# Patient Record
Sex: FEMALE | Race: WHITE | NOT HISPANIC OR LATINO | URBAN - METROPOLITAN AREA
[De-identification: names, ages, dates, MRNs, and addresses within clinical notes are randomized per-mention and may not be internally consistent; named-entity substitution may affect disease eponyms.]

---

## 2018-06-01 ENCOUNTER — OUTPATIENT (OUTPATIENT)
Dept: OUTPATIENT SERVICES | Facility: HOSPITAL | Age: 53
LOS: 1 days | End: 2018-06-01
Payer: MEDICAID

## 2018-06-01 PROCEDURE — G9001: CPT

## 2018-06-15 DIAGNOSIS — R69 ILLNESS, UNSPECIFIED: ICD-10-CM

## 2018-07-01 ENCOUNTER — OUTPATIENT (OUTPATIENT)
Dept: OUTPATIENT SERVICES | Facility: HOSPITAL | Age: 53
LOS: 1 days | End: 2018-07-01

## 2018-07-19 DIAGNOSIS — Z71.89 OTHER SPECIFIED COUNSELING: ICD-10-CM

## 2024-06-11 ENCOUNTER — EMERGENCY (EMERGENCY)
Facility: HOSPITAL | Age: 59
LOS: 1 days | Discharge: HOME | End: 2024-06-11
Attending: EMERGENCY MEDICINE | Admitting: EMERGENCY MEDICINE
Payer: MEDICAID

## 2024-06-11 PROCEDURE — 99285 EMERGENCY DEPT VISIT HI MDM: CPT

## 2024-06-12 VITALS
RESPIRATION RATE: 16 BRPM | HEART RATE: 60 BPM | DIASTOLIC BLOOD PRESSURE: 78 MMHG | SYSTOLIC BLOOD PRESSURE: 133 MMHG | OXYGEN SATURATION: 96 %

## 2024-06-12 VITALS
DIASTOLIC BLOOD PRESSURE: 61 MMHG | TEMPERATURE: 98 F | SYSTOLIC BLOOD PRESSURE: 153 MMHG | RESPIRATION RATE: 18 BRPM | HEART RATE: 71 BPM | OXYGEN SATURATION: 100 %

## 2024-06-12 LAB
ALBUMIN SERPL ELPH-MCNC: 3.6 G/DL — SIGNIFICANT CHANGE UP (ref 3.3–5)
ALP SERPL-CCNC: 95 U/L — SIGNIFICANT CHANGE UP (ref 40–120)
ALT FLD-CCNC: 12 U/L — SIGNIFICANT CHANGE UP (ref 4–33)
ANION GAP SERPL CALC-SCNC: 15 MMOL/L — HIGH (ref 7–14)
APTT BLD: 30.2 SEC — SIGNIFICANT CHANGE UP (ref 24.5–35.6)
AST SERPL-CCNC: 18 U/L — SIGNIFICANT CHANGE UP (ref 4–32)
BASE EXCESS BLDV CALC-SCNC: 4.1 MMOL/L — HIGH (ref -2–3)
BASOPHILS # BLD AUTO: 0.03 K/UL — SIGNIFICANT CHANGE UP (ref 0–0.2)
BASOPHILS NFR BLD AUTO: 0.3 % — SIGNIFICANT CHANGE UP (ref 0–2)
BILIRUB SERPL-MCNC: <0.2 MG/DL — SIGNIFICANT CHANGE UP (ref 0.2–1.2)
BLOOD GAS VENOUS COMPREHENSIVE RESULT: SIGNIFICANT CHANGE UP
BUN SERPL-MCNC: 12 MG/DL — SIGNIFICANT CHANGE UP (ref 7–23)
CALCIUM SERPL-MCNC: 8.6 MG/DL — SIGNIFICANT CHANGE UP (ref 8.4–10.5)
CHLORIDE BLDV-SCNC: 100 MMOL/L — SIGNIFICANT CHANGE UP (ref 96–108)
CHLORIDE SERPL-SCNC: 99 MMOL/L — SIGNIFICANT CHANGE UP (ref 98–107)
CO2 BLDV-SCNC: 33.6 MMOL/L — HIGH (ref 22–26)
CO2 SERPL-SCNC: 24 MMOL/L — SIGNIFICANT CHANGE UP (ref 22–31)
CREAT SERPL-MCNC: 0.67 MG/DL — SIGNIFICANT CHANGE UP (ref 0.5–1.3)
EGFR: 101 ML/MIN/1.73M2 — SIGNIFICANT CHANGE UP
EOSINOPHIL # BLD AUTO: 0.14 K/UL — SIGNIFICANT CHANGE UP (ref 0–0.5)
EOSINOPHIL NFR BLD AUTO: 1.3 % — SIGNIFICANT CHANGE UP (ref 0–6)
GAS PNL BLDV: 134 MMOL/L — LOW (ref 136–145)
GLUCOSE BLDV-MCNC: 127 MG/DL — HIGH (ref 70–99)
GLUCOSE SERPL-MCNC: 122 MG/DL — HIGH (ref 70–99)
HCO3 BLDV-SCNC: 32 MMOL/L — HIGH (ref 22–29)
HCT VFR BLD CALC: 45.6 % — HIGH (ref 34.5–45)
HCT VFR BLDA CALC: 46 % — SIGNIFICANT CHANGE UP (ref 34.5–46.5)
HGB BLD CALC-MCNC: 15.3 G/DL — SIGNIFICANT CHANGE UP (ref 11.7–16.1)
HGB BLD-MCNC: 15 G/DL — SIGNIFICANT CHANGE UP (ref 11.5–15.5)
IANC: 7.1 K/UL — SIGNIFICANT CHANGE UP (ref 1.8–7.4)
IMM GRANULOCYTES NFR BLD AUTO: 0.5 % — SIGNIFICANT CHANGE UP (ref 0–0.9)
INR BLD: 1.13 RATIO — SIGNIFICANT CHANGE UP (ref 0.85–1.18)
LACTATE BLDV-MCNC: 1.7 MMOL/L — SIGNIFICANT CHANGE UP (ref 0.5–2)
LYMPHOCYTES # BLD AUTO: 2.55 K/UL — SIGNIFICANT CHANGE UP (ref 1–3.3)
LYMPHOCYTES # BLD AUTO: 23.6 % — SIGNIFICANT CHANGE UP (ref 13–44)
MAGNESIUM SERPL-MCNC: 2.2 MG/DL — SIGNIFICANT CHANGE UP (ref 1.6–2.6)
MCHC RBC-ENTMCNC: 27.3 PG — SIGNIFICANT CHANGE UP (ref 27–34)
MCHC RBC-ENTMCNC: 32.9 GM/DL — SIGNIFICANT CHANGE UP (ref 32–36)
MCV RBC AUTO: 82.9 FL — SIGNIFICANT CHANGE UP (ref 80–100)
MONOCYTES # BLD AUTO: 0.93 K/UL — HIGH (ref 0–0.9)
MONOCYTES NFR BLD AUTO: 8.6 % — SIGNIFICANT CHANGE UP (ref 2–14)
NEUTROPHILS # BLD AUTO: 7.1 K/UL — SIGNIFICANT CHANGE UP (ref 1.8–7.4)
NEUTROPHILS NFR BLD AUTO: 65.7 % — SIGNIFICANT CHANGE UP (ref 43–77)
NRBC # BLD: 0 /100 WBCS — SIGNIFICANT CHANGE UP (ref 0–0)
NRBC # FLD: 0 K/UL — SIGNIFICANT CHANGE UP (ref 0–0)
PCO2 BLDV: 59 MMHG — HIGH (ref 39–52)
PH BLDV: 7.34 — SIGNIFICANT CHANGE UP (ref 7.32–7.43)
PLATELET # BLD AUTO: 256 K/UL — SIGNIFICANT CHANGE UP (ref 150–400)
PO2 BLDV: 31 MMHG — SIGNIFICANT CHANGE UP (ref 25–45)
POTASSIUM BLDV-SCNC: 4.5 MMOL/L — SIGNIFICANT CHANGE UP (ref 3.5–5.1)
POTASSIUM SERPL-MCNC: 4.6 MMOL/L — SIGNIFICANT CHANGE UP (ref 3.5–5.3)
POTASSIUM SERPL-SCNC: 4.6 MMOL/L — SIGNIFICANT CHANGE UP (ref 3.5–5.3)
PROT SERPL-MCNC: 6.7 G/DL — SIGNIFICANT CHANGE UP (ref 6–8.3)
PROTHROM AB SERPL-ACNC: 12.6 SEC — SIGNIFICANT CHANGE UP (ref 9.5–13)
RBC # BLD: 5.5 M/UL — HIGH (ref 3.8–5.2)
RBC # FLD: 13.4 % — SIGNIFICANT CHANGE UP (ref 10.3–14.5)
SAO2 % BLDV: 52.1 % — LOW (ref 67–88)
SODIUM SERPL-SCNC: 138 MMOL/L — SIGNIFICANT CHANGE UP (ref 135–145)
WBC # BLD: 10.8 K/UL — HIGH (ref 3.8–10.5)
WBC # FLD AUTO: 10.8 K/UL — HIGH (ref 3.8–10.5)

## 2024-06-12 PROCEDURE — 70450 CT HEAD/BRAIN W/O DYE: CPT | Mod: 26,MC

## 2024-06-12 PROCEDURE — 93010 ELECTROCARDIOGRAM REPORT: CPT

## 2024-06-12 PROCEDURE — 70481 CT ORBIT/EAR/FOSSA W/DYE: CPT | Mod: 26,MC

## 2024-06-12 RX ORDER — ACETAMINOPHEN 500 MG
1000 TABLET ORAL ONCE
Refills: 0 | Status: COMPLETED | OUTPATIENT
Start: 2024-06-12 | End: 2024-06-12

## 2024-06-12 RX ORDER — KETOROLAC TROMETHAMINE 30 MG/ML
15 SYRINGE (ML) INJECTION ONCE
Refills: 0 | Status: DISCONTINUED | OUTPATIENT
Start: 2024-06-12 | End: 2024-06-12

## 2024-06-12 RX ADMIN — Medication 15 MILLIGRAM(S): at 05:20

## 2024-06-12 RX ADMIN — Medication 400 MILLIGRAM(S): at 01:25

## 2024-06-12 NOTE — ED PROVIDER NOTE - NSFOLLOWUPINSTRUCTIONS_ED_ALL_ED_FT
You were seen in the Emergency Department for right facial droop, right eye blurry vision, right arm numbness.     1) Advance activity as tolerated.   2) Take tylenol 975mg every 6-8 hours and Motrin 400mg every 6-8 hours. Continue all previously prescribed medications as directed.    3) Follow up with ENT and your primary care physician in 2-3 days for the symptoms, and known mass in your nose that was re-demonstrated on CT - take copies of your results.    4) Return to the Emergency Department for worsening or persistent symptoms, and/or ANY NEW OR CONCERNING SYMPTOMS.    SEEK IMMEDIATE MEDICAL CARE IF YOU HAVE ANY OF THE FOLLOWING SYMPTOMS: an injury or infection that is not healing, dizziness, vomiting, paralysis, chest pain, or trouble breathing.

## 2024-06-12 NOTE — ED PROVIDER NOTE - PATIENT PORTAL LINK FT
You can access the FollowMyHealth Patient Portal offered by [facility] by registering at the following website: [email or web address]. By joining [facility]’s FollowMyHealth portal, you will also be able to view your health information using other applications (apps) compatible with our system.

## 2024-06-12 NOTE — ED ADULT NURSE NOTE - OBJECTIVE STATEMENT
Break RN: 58yo female received in room 4. pt A&Ox4, ambulatory with cane, c/o headache x 2 weeks, and right facial droop and numbness, blurriness in right eye since Wednesday. Was evaluated in [facility] and was prescribed prednisone without relief. Patient upper and lower extremities strength equal. Some slurred speech noted.  Respiration even and non-labored. in NAD. 20G IV placed to LAC, lab drawn and sent. Side rails up, bed at lowest position, call bell within reach, patient oriented to the unit, safety maintained. Pending CT

## 2024-06-12 NOTE — CHART NOTE - NSCHARTNOTEFT_GEN_A_CORE
Per chart review, pt is cleared for DC. SW followed up with provider who confirmed pt is DC and will require wheelchair ambulette for DC home.     SW arranged trip through [facility] portal (Inv# [id number]). Trip assigned to [facility] who is not contracted with [facility]. Trip was rescheduled multiple times to [facility], [facility] and [facility] all of whom have No availability in the next few hours/today at all.     To avoid DC delay, SW arranged bill back ambulette with [facility] for 12:30pm (next available). Trip# [id number].    No other SW needs identified at this time.

## 2024-06-12 NOTE — ED PROVIDER NOTE - NSFOLLOWUPCLINICS_GEN_ALL_ED_FT
[facility]  Otolaryngology (ENT)  [address], [address]  [place] [address]  Phone: [phone]  Fax:     [facility] - ENT  Otolaryngology (ENT)  [address] [place] [address]  Phone: [phone]  Fax:

## 2024-06-12 NOTE — ED ADULT TRIAGE NOTE - CHIEF COMPLAINT QUOTE
c/o right side facial droop, right eye blurriness and right arm numbness since last week Wednesday. Had headache x 2 weeks, was seen in [facility] and prescribed Ibuprofen. Hx of HTN c/o right side facial droop, right eye blurriness and right face numbness since last week Wednesday. Had headache x 2 weeks, was seen in [facility] and prescribed prednisone. Hx of HTN

## 2024-06-12 NOTE — ED PROVIDER NOTE - CARE PLAN
Principal Discharge DX:	Facial droop  Secondary Diagnosis:	Numbness  Secondary Diagnosis:	Blurry vision   1

## 2024-06-12 NOTE — ED PROVIDER NOTE - ATTENDING CONTRIBUTION TO CARE
59-year-old female with history of hypertension presenting with right-sided ear/head pain and right-sided facial droop.  Patient reports she was seen on 6/1/2024 at [facility] for pain to the right side of her head.  Paperwork from that time reviewed including CT imaging of the temporal bone/CT angio/CT brain, which was unremarkable aside from a R nasal mass for which she was sent to f/u with ENT (pt did not go to her appt on 6/8).  Patient was discharged on a course of prednisone which she completed 2 days ago.  Patient reports on 6/5/2024 she develops right-sided facial droop, unable to close the right side of her mouth or her right eye.  No associated fever, chills, URI symptoms, trauma or injury, other weakness/numbness/tingling.    Well appearing, sitting comfortably in stretcher, awake and alert, nontoxic.  AF/VSS.  NCAT EOMI PERRL, R upper and lower facial droop. Bilat ears normal, tm with no effusion, normal light reflex.  Neck supple.  Lungs cta bl.  Cards nl S1/S2, RRR, no MRG.  Abd soft ntnd.  No other focal neuro deficits.    Patient with right-sided Bell's palsy.  She has completed outpatient course of prednisone, is now out of the time window for antiviral therapy.  Given associated right sided headache with mastoid tenderness on exam will repeat CT imaging to rule out underlying mastoiditis although lower suspicion given recent negative CT.  Plan for labs, CT, pain meds, reassess for outpatient ENT follow-up.

## 2024-06-12 NOTE — ED PROVIDER NOTE - CLINICAL SUMMARY MEDICAL DECISION MAKING FREE TEXT BOX
59-year-old female pmhx of hypertension comes to ED w/ right facial droop, right eye blurry vision, right arm numbness for 1 week. Patient reports that symptoms initially started 2 weeks prior, worsened to the facial droop 1 week prior.  Patient went to [facility] for evaluation and received CTs which were normal.  Patient unclear what diagnosis they were discharged with but reports being discharged with prednisone and ibuprofen outpatient.  Patient completed course however reports that symptoms have worsened prompting her to come to emergency department.  Their pain/symptom is moderate, constant, non mediating with rest. Denies trauma, falls, fever, LOC, Head Trauma, AMS, dizziness, syncope, shortness of breath, cough, rhinorrhea, congestion, chest pain, palpitations, abdominal pain, nausea, vomiting, diarrhea, constipation, melena, hematochezia, dysuria, hematuria, urinary frequency, flank pain, skin changes, p.o. issues, issues urinating, issues stooling, issues with ambulation, back pain.    General: NAD   HEENT: Visible right facial droop involving the right forehead.  Tenderness to the right mastoid.  NCAT, PERRL   Cardiac: RRR, no murmurs, 2+ peripheral pulses   Chest: CTAB  Abdomen: soft, non-distended, bowel sounds present, no ttp, no rebound or guarding   Extremities: no peripheral edema, calf tenderness, or leg size discrepancies   Skin: no rashes   Neuro: AAOx4, 5+motor, right arm numbness otherwise sensory grossly intact   Psych: mood and affect appropriate    Impression: 59-year-old female pmhx of hypertension comes to ED w/ right facial droop, right eye blurry vision, right arm numbness for 1 week. Their symptoms and exam findings are concerning for Bell's palsy, peripheral neuropathy, mastoiditis.  Given recent normal CTs TIA and stroke unlikely however will repeat CT head.    Ordered labs, imaging, medications for diagnosis, management, and treatment. 59-year-old female pmhx of hypertension comes to ED w/ right facial droop, right eye blurry vision, right arm numbness for 1 week. Patient reports that symptoms initially started 2 weeks prior, worsened to the facial droop 1 week prior.  Patient went to [facility] for evaluation and received CTs which were normal.  Patient unclear what diagnosis they were discharged with but reports being discharged with prednisone and ibuprofen outpatient.  Patient completed course however reports that symptoms have worsened prompting her to come to emergency department.  Their pain/symptom is moderate, constant, non mediating with rest. Denies trauma, falls, fever, LOC, Head Trauma, AMS, dizziness, syncope, shortness of breath, cough, rhinorrhea, congestion, chest pain, palpitations, abdominal pain, nausea, vomiting, diarrhea, constipation, melena, hematochezia, dysuria, hematuria, urinary frequency, flank pain, skin changes, p.o. issues, issues urinating, issues stooling, issues with ambulation, back pain.    General: NAD   HEENT: Visible right facial droop involving the right forehead.  Tenderness to the right mastoid.  NCAT, PERRL   Cardiac: RRR, no murmurs, 2+ peripheral pulses   Chest: CTAB  Abdomen: soft, non-distended, bowel sounds present, no ttp, no rebound or guarding   Extremities: no peripheral edema, calf tenderness, or leg size discrepancies   Skin: no rashes   Neuro: AAOx4, 5+motor, right arm numbness otherwise sensory grossly intact   Psych: mood and affect appropriate    Impression: 59-year-old female pmhx of hypertension comes to ED w/ right facial droop, right eye blurry vision, right arm numbness for 1 week. Their symptoms and exam findings are concerning for Bell's palsy, peripheral neuropathy, mastoiditis.  Given recent normal CTs TIA and stroke unlikely however will repeat CT head due to mastoiditis concern.    Ordered labs, imaging, medications for diagnosis, management, and treatment. 59-year-old female pmhx of hypertension comes to ED w/ right facial droop, right eye blurry vision, right arm numbness for 1 week. Patient reports that symptoms initially started 2 weeks prior, worsened to the facial droop 1 week prior.  Patient went to [facility] for evaluation and received CTs which were normal.  Patient unclear what diagnosis they were discharged with but reports being discharged with prednisone and ibuprofen outpatient which patient completed courses for.  Patient completed course however reports that symptoms have worsened prompting her to come to emergency department.  Their pain/symptom is moderate, constant, non mediating with rest. Denies trauma, falls, fever, LOC, Head Trauma, AMS, dizziness, syncope, shortness of breath, cough, rhinorrhea, congestion, chest pain, palpitations, abdominal pain, nausea, vomiting, diarrhea, constipation, melena, hematochezia, dysuria, hematuria, urinary frequency, flank pain, skin changes, p.o. issues, issues urinating, issues stooling, issues with ambulation, back pain.    General: NAD   HEENT: Visible right facial droop involving the right forehead.  Tenderness to the right mastoid.  NCAT, PERRL   Cardiac: RRR, no murmurs, 2+ peripheral pulses   Chest: CTAB  Abdomen: soft, non-distended, bowel sounds present, no ttp, no rebound or guarding   Extremities: no peripheral edema, calf tenderness, or leg size discrepancies   Skin: no rashes   Neuro: AAOx4, 5+motor, right arm numbness otherwise sensory grossly intact   Psych: mood and affect appropriate    Impression: 59-year-old female pmhx of hypertension comes to ED w/ right facial droop, right eye blurry vision, right arm numbness for 1 week. Their symptoms and exam findings are concerning for Bell's palsy, peripheral neuropathy, mastoiditis.  Given recent normal CTs TIA and stroke unlikely however will repeat CT head due to mastoiditis concern.    Ordered labs, imaging, medications for diagnosis, management, and treatment.

## 2024-06-13 NOTE — POST DISCHARGE NOTE - NOTIFICATION:
Patient's CT scan reflected on the Radiology Incidental Findings report for today. L/M for patient to provide outpatient assistance. Will continue to follow up. oral